# Patient Record
Sex: FEMALE | Race: WHITE | NOT HISPANIC OR LATINO | URBAN - METROPOLITAN AREA
[De-identification: names, ages, dates, MRNs, and addresses within clinical notes are randomized per-mention and may not be internally consistent; named-entity substitution may affect disease eponyms.]

---

## 2023-08-10 NOTE — PROGRESS NOTES
"Chief Complaint: kidney stone (Left side)    Subjective         History of Present Illness  Ángela Landrum is a 37 y.o. female presents to [facility] to be seen for kidney stone.    Patient presents reporting she went to the ER for RUQ pain. She was found to have a kidney stone on the left and a cyst on the ovary. She is not having any urinary symptoms.         CT abdomen pelvis without contrast  6/29/2023: Impression: 3 mm nonobstructing left renal stone.    Urinalysis  5/17/2023 negative for blood  6/5/2023 negative for blood    Frequency- denies    Urgency- denies    Incontinence- denies    Nocturia- denies    GH- denies    History of stones- denies     surgeries- denies    Family history of  malignancy- denies    Cardiopulmonary- denies    Anticoagulants-denies    Smoker- denies    Objective     Past Medical History:   Diagnosis Date    Anxiety        History reviewed. No pertinent surgical history.      Current Outpatient Medications:     sertraline (ZOLOFT) 25 MG tablet, Take 1 tablet by mouth Daily., Disp: , Rfl:     No Known Allergies     History reviewed. No pertinent family history.    Social History     Socioeconomic History    Marital status:    Tobacco Use    Smoking status: Never     Passive exposure: Never    Smokeless tobacco: Never   Vaping Use    Vaping Use: Never used       Vital Signs:   Resp 18   Ht 152.4 cm (60\")   Wt 94.7 kg (208 lb 12.8 oz)   BMI 40.78 kg/mý      Physical Exam  Vitals reviewed.   Constitutional:       Appearance: Normal appearance.   Neurological:      General: No focal deficit present.      Mental Status: She is alert and oriented to person, place, and time.   Psychiatric:         Mood and Affect: Mood normal.         Behavior: Behavior normal.        Result Review :   The following data was reviewed by: ANGEL Gomez on 08/14/2023:  No results found for this or any previous visit.         Procedures      "   Assessment and Plan    Diagnoses and all orders for this visit:    1. Kidney stone (Primary)  -     XR Abdomen KUB; Future    Given her stone is within the kidney we did discuss that no intervention is needed at this time as the stone is she would have a 60 to 70% chance of passing without intervention.  We did discuss increasing her fluid intake.  We will plan to see her back in 1 year with a KUB prior to evaluate the stone.  We did discuss that if she develops severe pain, fever, nausea vomiting she should go to the emergency department, however if she begins to slowly develop pain and blood in the urine she could follow-up here to evaluate location of stone at that time.    Follow-up in 1 year with KUB prior      Follow Up   Return in about 1 year (around 8/14/2024) for with KUB prior.  Patient was given instructions and counseling regarding her condition or for health maintenance advice. Please see specific information pulled into the AVS if appropriate.         This document has been electronically signed by ANGEL Gomez  August 14, 2023 10:13 EDT

## 2023-08-14 ENCOUNTER — OFFICE VISIT (OUTPATIENT)
Dept: UROLOGY | Facility: CLINIC | Age: 38
End: 2023-08-14
Payer: COMMERCIAL

## 2023-08-14 VITALS — HEIGHT: 60 IN | RESPIRATION RATE: 18 BRPM | WEIGHT: 208.8 LBS | BODY MASS INDEX: 40.99 KG/M2

## 2023-08-14 DIAGNOSIS — N20.0 KIDNEY STONE: Primary | ICD-10-CM

## 2023-08-14 PROCEDURE — 99203 OFFICE O/P NEW LOW 30 MIN: CPT | Performed by: NURSE PRACTITIONER

## 2023-08-14 RX ORDER — SERTRALINE HYDROCHLORIDE 25 MG/1
25 TABLET, FILM COATED ORAL DAILY
COMMUNITY
Start: 2023-06-05

## 2024-09-13 ENCOUNTER — TELEPHONE (OUTPATIENT)
Dept: UROLOGY | Facility: CLINIC | Age: 39
End: 2024-09-13
Payer: COMMERCIAL

## 2024-09-13 NOTE — TELEPHONE ENCOUNTER
Called patient about her KUB; she stated that she had not done it; she stated that her kidney stone is not bothering her at this time; she stated that she does not want to pay another co-pay and just wants to hold off on the xray. I told her to call if she has any issues and that I would let the provider know